# Patient Record
Sex: MALE | Race: WHITE
[De-identification: names, ages, dates, MRNs, and addresses within clinical notes are randomized per-mention and may not be internally consistent; named-entity substitution may affect disease eponyms.]

---

## 2019-03-05 ENCOUNTER — HOSPITAL ENCOUNTER (INPATIENT)
Dept: HOSPITAL 53 - M ED | Age: 44
LOS: 2 days | Discharge: HOME | DRG: 882 | End: 2019-03-07
Attending: PSYCHIATRY & NEUROLOGY | Admitting: PSYCHIATRY & NEUROLOGY
Payer: OTHER GOVERNMENT

## 2019-03-05 VITALS — WEIGHT: 185.39 LBS | HEIGHT: 68 IN | BODY MASS INDEX: 28.1 KG/M2

## 2019-03-05 DIAGNOSIS — F17.200: ICD-10-CM

## 2019-03-05 DIAGNOSIS — Z79.899: ICD-10-CM

## 2019-03-05 DIAGNOSIS — F41.9: ICD-10-CM

## 2019-03-05 DIAGNOSIS — E03.9: ICD-10-CM

## 2019-03-05 DIAGNOSIS — F13.90: ICD-10-CM

## 2019-03-05 DIAGNOSIS — Z88.5: ICD-10-CM

## 2019-03-05 DIAGNOSIS — F10.10: ICD-10-CM

## 2019-03-05 DIAGNOSIS — F43.10: Primary | ICD-10-CM

## 2019-03-05 DIAGNOSIS — I10: ICD-10-CM

## 2019-03-05 DIAGNOSIS — G47.00: ICD-10-CM

## 2019-03-05 DIAGNOSIS — Z88.8: ICD-10-CM

## 2019-03-05 DIAGNOSIS — K21.9: ICD-10-CM

## 2019-03-05 LAB
ALBUMIN SERPL BCG-MCNC: 4.3 GM/DL (ref 3.2–5.2)
ALT SERPL W P-5'-P-CCNC: 51 U/L (ref 12–78)
AMPHETAMINES UR QL SCN: NEGATIVE
APAP SERPL-MCNC: < 2 UG/ML (ref 10–30)
BARBITURATES UR QL SCN: NEGATIVE
BENZODIAZ UR QL SCN: NEGATIVE
BILIRUB CONJ SERPL-MCNC: 0.1 MG/DL (ref 0–0.2)
BILIRUB SERPL-MCNC: 0.2 MG/DL (ref 0.2–1)
BUN SERPL-MCNC: 16 MG/DL (ref 7–18)
BZE UR QL SCN: NEGATIVE
CALCIUM SERPL-MCNC: 9 MG/DL (ref 8.5–10.1)
CANNABINOIDS UR QL SCN: POSITIVE
CHLORIDE SERPL-SCNC: 103 MEQ/L (ref 98–107)
CO2 SERPL-SCNC: 24 MEQ/L (ref 21–32)
CREAT SERPL-MCNC: 1.06 MG/DL (ref 0.7–1.3)
ETHANOL SERPL-MCNC: 0.14 % (ref 0–0.01)
GFR SERPL CREATININE-BSD FRML MDRD: > 60 ML/MIN/{1.73_M2} (ref 60–?)
GLUCOSE SERPL-MCNC: 93 MG/DL (ref 70–100)
HCT VFR BLD AUTO: 45.2 % (ref 42–52)
HGB BLD-MCNC: 15.5 G/DL (ref 13.5–17.5)
MCH RBC QN AUTO: 29.5 PG (ref 27–33)
MCHC RBC AUTO-ENTMCNC: 34.3 G/DL (ref 32–36.5)
MCV RBC AUTO: 86.1 FL (ref 80–96)
METHADONE UR QL SCN: NEGATIVE
OPIATES UR QL SCN: NEGATIVE
PCP UR QL SCN: NEGATIVE
PLATELET # BLD AUTO: 305 10^3/UL (ref 150–450)
POTASSIUM SERPL-SCNC: 4.3 MEQ/L (ref 3.5–5.1)
PROT SERPL-MCNC: 7.4 GM/DL (ref 6.4–8.2)
RBC # BLD AUTO: 5.25 10^6/UL (ref 4.3–6.1)
SALICYLATES SERPL-MCNC: 3.5 MG/DL (ref 5–30)
SODIUM SERPL-SCNC: 136 MEQ/L (ref 136–145)
TSH SERPL DL<=0.005 MIU/L-ACNC: 2.03 UIU/ML (ref 0.36–3.74)
WBC # BLD AUTO: 8.5 10^3/UL (ref 4–10)

## 2019-03-05 RX ADMIN — Medication SCH MG: at 22:05

## 2019-03-05 RX ADMIN — PRAZOSIN HYDROCHLORIDE SCH MG: 1 CAPSULE ORAL at 22:05

## 2019-03-06 VITALS — SYSTOLIC BLOOD PRESSURE: 142 MMHG | DIASTOLIC BLOOD PRESSURE: 98 MMHG

## 2019-03-06 VITALS — SYSTOLIC BLOOD PRESSURE: 148 MMHG | DIASTOLIC BLOOD PRESSURE: 98 MMHG

## 2019-03-06 VITALS — DIASTOLIC BLOOD PRESSURE: 94 MMHG | SYSTOLIC BLOOD PRESSURE: 148 MMHG

## 2019-03-06 VITALS — SYSTOLIC BLOOD PRESSURE: 136 MMHG | DIASTOLIC BLOOD PRESSURE: 82 MMHG

## 2019-03-06 RX ADMIN — DULOXETINE HYDROCHLORIDE SCH MG: 30 CAPSULE, DELAYED RELEASE ORAL at 09:09

## 2019-03-06 RX ADMIN — PRAZOSIN HYDROCHLORIDE SCH MG: 1 CAPSULE ORAL at 21:00

## 2019-03-06 RX ADMIN — MULTIPLE VITAMINS W/ MINERALS TAB SCH TAB: TAB at 09:09

## 2019-03-06 RX ADMIN — OMEPRAZOLE SCH MG: 20 CAPSULE, DELAYED RELEASE ORAL at 21:00

## 2019-03-06 RX ADMIN — Medication SCH MG: at 09:09

## 2019-03-06 RX ADMIN — METOPROLOL TARTRATE SCH MG: 25 TABLET, FILM COATED ORAL at 09:00

## 2019-03-06 RX ADMIN — Medication SCH MG: at 21:00

## 2019-03-06 RX ADMIN — OMEPRAZOLE SCH MG: 20 CAPSULE, DELAYED RELEASE ORAL at 12:46

## 2019-03-06 RX ADMIN — FOLIC ACID SCH MG: 1 TABLET ORAL at 09:09

## 2019-03-06 RX ADMIN — METOPROLOL TARTRATE SCH MG: 25 TABLET, FILM COATED ORAL at 12:24

## 2019-03-06 NOTE — HPEPDOC
[facility] Medical History & Physical


Date of Admission


Mar 5, 2019





History and Physical





PCP: [facility]


ATTENDING: Dr. Lisa Carter





HPI: 43yoM admitted to [facility] for unspecified depressive disorder, being medically

examined today. 


No acute medical complaints today. 


Denies any fevers, chills, weakness, fatigue, HA, CP, SOB, cough, palpitations, 

abdominal pain, N/V/D or changes in bowel or bladder habits.





PMHx: 


anxiety


depression


PTSD


insomnia


HTN


HLD


GERD


h/o opiate use





PSHX:


Rt delilah tendon repair


Rt elbow


Rt shoulder x 3 s/p injury


Rt hand fracture repair








SOCHX:


Resides in:  [facility]


Marital Status:  


Kids: none


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 


Tobacco use: 1/2 ppd


ETOH: 8-10 drinks yesterday, states none otherwise


Illicit Drugs: States receives medical marijuana from [employer] for mood and 

sleep. 


IV Drug Use: Denies


Tattoos done unprofessionally: Denies 





FAMHX:


Mother: unknown


Father: unknown


Siblings: unknown


Children: none


Unexpected deaths due to medical reasons: None.





ROS:


As noted in HPI, otherwise 11pt ROS of systems reviewed and unremarkable.


                 


PE:      


GEN:  43yoM, appears stated age. Well-nourished, well developed. No acute 

distress. Alert and oriented x 3. Pleasant, interactive. 


HEENT: Normocephalic, atraumatic. Pupils are equal, round, and reactive to 

light. Extraocular movements are intact. No nystagmus appreciated. Sclera are 

nonicteric. Conjunctiva without injection. Nose midline. Nasal turbinates 

without bogginess. EACs both patent BL. TMs both visualized and gray with good 

cone of light, no bulging or erythema. No facial asymmetry. Moist mucous 

membranes. Dentition fair. Pharynx pink and moist, no cobblestoning. Neck supple

, trachea midline. No lymphadenopathy or thyromegaly appreciated. 


CHEST: Regular rate and rhythm, +S1, +S2


LUNGS: Clear to auscultation bilaterally. No wheezes, rales, or rhonchi. 

Breathing appears symmetric and easy. Patient is speaking in full sentences. No 

accessory muscle use.


ABD: Round, soft, non-tender, non-distended. +Bowel sounds throughout. No 

rebound or guarding. No costovertebral angle tenderness.


EXT: Pulses 2+ bilaterally dorsalis pedis and radial. No lower extremity edema 

appreciated.


SKIN: Pink, dry, warm. Capillary refill <2sec. No rashes.


NEURO: Alert and oriented x 3. Cranial nerves III-XII are intact. No focal 

deficits appreciated. 





EKG:  


SINUS RHYTHM WITH SHORT [place] INTERVAL WITH OCCASIONAL VENTRICULAR PREMATURE 


COMPLEXES


NSTTW ABNORMALITY


BORDERLINE RIGHT AXIS DEVIATION


DECREASED RATE 10/22/18


Electronically Signed On 11- 20:43:30 EST by Leny Silva





A&P:  43yoM admitted to [facility] for unspecified depressive disorder


1. Psych. Plan per Psychiatry. EKG on file.  


2. Nicotine dependence. Patch available.


3. HTN. 


Continue Lisinopril 40 mg po daily with hold parameters. 


Continue Metoprolol with hold parameters. 


4. HLD. 


Continue statin. 


5. Follow up with PCP on discharge.


6. Substance use. Management per psychiatry. Continue MVI, folic acid and 

thiamine supplements. 


7. Hypothyroid. 


Continue supplement. 


TSH WNL. 


8. GERD. 


Continue prilosec 40 mg po daily. 


9. Staff member Charissa LOTT present throughout exam.





Vital Signs





Vital Signs








  Date Time  Temp Pulse Resp B/P (MAP) Pulse Ox O2 Delivery O2 Flow Rate FiO2


 


3/6/19 07:59  87  142/98    


 


3/6/19 06:16 99.9  18     


 


3/5/19 20:39     96 Room Air  











Laboratory Data


Labs 24H


Laboratory Tests 2


3/5/19 17:46: 


Nucleated Red Blood Cells % (auto) 0.0, Anion Gap 9, Glomerular Filtration Rate 

> 60.0, Calcium Level 9.0, Aspartate Amino Transf (AST/SGOT) 29, Alanine 

Aminotransferase (ALT/SGPT) 51, Alkaline Phosphatase 121H, Total Bilirubin 0.2, 

Direct Bilirubin 0.1, Total Protein 7.4, Albumin 4.3, Albumin/Globulin Ratio 

1.39, Thyroid Stimulating Hormone (TSH) 2.030, Salicylates Level 3.5L, Urine 

Amphetamines Screen NEGATIVE, Urine Benzodiazepines Screen NEGATIVE, Urine 

Opiates Screen NEGATIVE, Urine Methadone Screen NEGATIVE, Acetaminophen Level < 

2.0L, Urine Barbiturates Screen NEGATIVE, Urine Phencyclidine Screen NEGATIVE, 

Urine Cocaine Metabolite Screen NEGATIVE, Urine Cannabinoids Screen POSITIVEH, 

Ethyl Alcohol Level 0.140H


CBC/BMP


Laboratory Tests


3/5/19 17:46








Red Blood Count 5.25, Mean Corpuscular Volume 86.1, Mean Corpuscular Hemoglobin 

29.5, Mean Corpuscular Hemoglobin Concent 34.3, Red Cell Distribution Width 13.5





Home Medications


Scheduled


Levothyroxine Sodium (Synthroid) 25 Mcg Tab, 1 TAB PO DAILY


Simvastatin (Simvastatin) 20 Mg Tab, 20 MG PO QPM





Miscellaneous Medications


 (Duloxetine Hydrocloride) 60 Mg Cap, 120 MG


Clonazepam (Klonopin) 1 Mg Tab


Lisinopril (Lisinopril) 40 Mg Tab


Metoprolol Tartrate (Metoprolol Tartrate) 25 Mg Tab


Omeprazole (Omeprazole Dr) 40 Mg Cap


Prazosin Hcl (Prazosin HCl) 2 Mg Cap


Quetiapine Fumerate (Seroquel) 100 Mg Tab, 100 MG





Allergies


Coded Allergies:  


     Acetaminophen (Verified  Adverse Reaction, Unknown, cannot take due to 

prior addiction, 11/24/18)


     Hydrocodone (Verified  Adverse Reaction, Unknown, cannot take due to prior 

addiction, 11/24/18)











Mone Prajapati               Mar 6, 2019 10:57
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
## 2019-03-07 VITALS — SYSTOLIC BLOOD PRESSURE: 148 MMHG | DIASTOLIC BLOOD PRESSURE: 98 MMHG

## 2019-03-07 VITALS — DIASTOLIC BLOOD PRESSURE: 98 MMHG | SYSTOLIC BLOOD PRESSURE: 151 MMHG

## 2019-03-07 RX ADMIN — OMEPRAZOLE SCH MG: 20 CAPSULE, DELAYED RELEASE ORAL at 08:08

## 2019-03-07 RX ADMIN — DULOXETINE HYDROCHLORIDE SCH MG: 30 CAPSULE, DELAYED RELEASE ORAL at 08:08

## 2019-03-07 RX ADMIN — METOPROLOL TARTRATE SCH MG: 25 TABLET, FILM COATED ORAL at 08:09

## 2019-03-07 RX ADMIN — FOLIC ACID SCH MG: 1 TABLET ORAL at 08:08

## 2019-03-07 RX ADMIN — MULTIPLE VITAMINS W/ MINERALS TAB SCH TAB: TAB at 08:08

## 2019-03-07 RX ADMIN — Medication SCH MG: at 08:08
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
## 2019-03-07 NOTE — REP
RIGHT HAND SERIES:

 

Four views of the right hand are performed.  I see no acute fracture or

dislocation.  There is an old healed fracture of the proximal fifth metacarpal.

Subchondral cystic changes are seen in the distal radius in the scaphoid.

 

IMPRESSION:

 

No acute fracture or dislocation.

 

 

Electronically Signed by

Mustapha Keith MD 03/07/2019 04:58 P

## 2022-05-23 ENCOUNTER — HOSPITAL ENCOUNTER (OUTPATIENT)
Dept: HOSPITAL 53 - M RAD | Age: 47
End: 2022-05-23
Attending: FAMILY MEDICINE

## 2022-05-23 DIAGNOSIS — M54.50: Primary | ICD-10-CM

## 2022-06-21 ENCOUNTER — HOSPITAL ENCOUNTER (OUTPATIENT)
Dept: HOSPITAL 53 - M PLALAB | Age: 47
End: 2022-06-21
Attending: INTERNAL MEDICINE
Payer: MEDICARE

## 2022-06-21 DIAGNOSIS — M46.20: ICD-10-CM

## 2022-06-21 DIAGNOSIS — L81.8: Primary | ICD-10-CM

## 2022-06-21 LAB
ALBUMIN SERPL BCG-MCNC: 4.2 GM/DL (ref 3.2–5.2)
ALT SERPL W P-5'-P-CCNC: 71 U/L (ref 12–78)
BASOPHILS # BLD AUTO: 0.1 10^3/UL (ref 0–0.2)
BASOPHILS NFR BLD AUTO: 0.9 % (ref 0–1)
BILIRUB SERPL-MCNC: 0.3 MG/DL (ref 0.2–1)
BUN SERPL-MCNC: 10 MG/DL (ref 7–18)
CALCIUM SERPL-MCNC: 9.6 MG/DL (ref 8.5–10.1)
CHLORIDE SERPL-SCNC: 104 MEQ/L (ref 98–107)
CO2 SERPL-SCNC: 28 MEQ/L (ref 21–32)
CREAT SERPL-MCNC: 0.93 MG/DL (ref 0.7–1.3)
CRP SERPL-MCNC: 0.3 MG/DL (ref 0–0.3)
EOSINOPHIL # BLD AUTO: 0.1 10^3/UL (ref 0–0.5)
EOSINOPHIL NFR BLD AUTO: 2 % (ref 0–3)
ERYTHROCYTE [SEDIMENTATION RATE] IN BLOOD BY WESTERGREN METHOD: 2 MM/HR (ref 0–15)
GFR SERPL CREATININE-BSD FRML MDRD: > 60 ML/MIN/{1.73_M2} (ref 60–?)
GLUCOSE SERPL-MCNC: 92 MG/DL (ref 70–100)
HCT VFR BLD AUTO: 44.8 % (ref 42–52)
HCV AB SER QL: 0.1 INDEX (ref ?–0.8)
HGB BLD-MCNC: 14.9 G/DL (ref 13.5–17.5)
LYMPHOCYTES # BLD AUTO: 1.5 10^3/UL (ref 1.5–5)
LYMPHOCYTES NFR BLD AUTO: 23.6 % (ref 24–44)
MCH RBC QN AUTO: 30.3 PG (ref 27–33)
MCHC RBC AUTO-ENTMCNC: 33.3 G/DL (ref 32–36.5)
MCV RBC AUTO: 91.1 FL (ref 80–96)
MONOCYTES # BLD AUTO: 0.6 10^3/UL (ref 0–0.8)
MONOCYTES NFR BLD AUTO: 9.8 % (ref 2–8)
NEUTROPHILS # BLD AUTO: 4.1 10^3/UL (ref 1.5–8.5)
NEUTROPHILS NFR BLD AUTO: 63.2 % (ref 36–66)
PLATELET # BLD AUTO: 376 10^3/UL (ref 150–450)
POTASSIUM SERPL-SCNC: 4.4 MEQ/L (ref 3.5–5.1)
PROT SERPL-MCNC: 6.9 GM/DL (ref 6.4–8.2)
RBC # BLD AUTO: 4.92 10^6/UL (ref 4.3–6.1)
SODIUM SERPL-SCNC: 140 MEQ/L (ref 136–145)
WBC # BLD AUTO: 6.5 10^3/UL (ref 4–10)

## 2022-07-13 ENCOUNTER — HOSPITAL ENCOUNTER (OUTPATIENT)
Dept: HOSPITAL 53 - M PLAIMG | Age: 47
End: 2022-07-13
Attending: FAMILY MEDICINE
Payer: OTHER GOVERNMENT

## 2022-07-13 DIAGNOSIS — M43.8X6: Primary | ICD-10-CM

## 2022-07-13 DIAGNOSIS — M54.50: ICD-10-CM

## 2022-07-13 DIAGNOSIS — Z96.9: ICD-10-CM

## 2022-07-22 ENCOUNTER — HOSPITAL ENCOUNTER (EMERGENCY)
Dept: HOSPITAL 53 - M ED | Age: 47
Discharge: LEFT BEFORE DISCHARGE | End: 2022-07-22
Payer: OTHER GOVERNMENT

## 2022-07-22 VITALS — BODY MASS INDEX: 25.23 KG/M2 | HEIGHT: 66 IN | WEIGHT: 156.97 LBS

## 2022-07-22 VITALS — DIASTOLIC BLOOD PRESSURE: 92 MMHG | SYSTOLIC BLOOD PRESSURE: 154 MMHG

## 2022-07-22 DIAGNOSIS — Z53.21: Primary | ICD-10-CM

## 2023-05-03 ENCOUNTER — HOSPITAL ENCOUNTER (OUTPATIENT)
Dept: HOSPITAL 53 - M SFHCDERM | Age: 48
End: 2023-05-03
Attending: NURSE PRACTITIONER
Payer: OTHER GOVERNMENT

## 2023-05-03 DIAGNOSIS — L30.9: Primary | ICD-10-CM

## 2023-06-02 ENCOUNTER — HOSPITAL ENCOUNTER (EMERGENCY)
Dept: HOSPITAL 53 - M ED | Age: 48
Discharge: PSYCHIATRIC OR SUBSTANCE USE FACILITY | End: 2023-06-02
Payer: MEDICARE

## 2023-06-02 VITALS — SYSTOLIC BLOOD PRESSURE: 168 MMHG | DIASTOLIC BLOOD PRESSURE: 102 MMHG

## 2023-06-02 VITALS — WEIGHT: 160.72 LBS | HEIGHT: 66 IN | BODY MASS INDEX: 25.83 KG/M2

## 2023-06-02 VITALS — DIASTOLIC BLOOD PRESSURE: 120 MMHG | OXYGEN SATURATION: 99 % | SYSTOLIC BLOOD PRESSURE: 168 MMHG | TEMPERATURE: 96.9 F

## 2023-06-02 DIAGNOSIS — K21.9: ICD-10-CM

## 2023-06-02 DIAGNOSIS — F32.A: ICD-10-CM

## 2023-06-02 DIAGNOSIS — F12.10: ICD-10-CM

## 2023-06-02 DIAGNOSIS — F41.9: ICD-10-CM

## 2023-06-02 DIAGNOSIS — I10: ICD-10-CM

## 2023-06-02 DIAGNOSIS — Z79.899: ICD-10-CM

## 2023-06-02 DIAGNOSIS — R45.851: Primary | ICD-10-CM

## 2023-06-02 DIAGNOSIS — F90.9: ICD-10-CM

## 2023-06-02 DIAGNOSIS — E78.5: ICD-10-CM

## 2023-06-02 LAB
ALBUMIN SERPL BCG-MCNC: 4.5 G/DL (ref 3.2–5.2)
ALP SERPL-CCNC: 194 U/L (ref 46–116)
ALT SERPL W P-5'-P-CCNC: 54 U/L (ref 7–40)
AMPHETAMINES UR QL SCN: NEGATIVE
APAP SERPL-MCNC: < 2 UG/ML (ref 10–20)
APPEARANCE UR: CLEAR
AST SERPL-CCNC: 16 U/L (ref ?–34)
BACTERIA UR QL AUTO: NEGATIVE
BARBITURATES UR QL SCN: NEGATIVE
BENZODIAZ UR QL SCN: NEGATIVE
BILIRUB CONJ SERPL-MCNC: < 0.1 MG/DL (ref ?–0.4)
BILIRUB SERPL-MCNC: 0.3 MG/DL (ref 0.3–1.2)
BILIRUB UR QL STRIP.AUTO: NEGATIVE
BUN SERPL-MCNC: 12 MG/DL (ref 9–23)
BZE UR QL SCN: NEGATIVE
CALCIUM SERPL-MCNC: 9.3 MG/DL (ref 8.5–10.1)
CANNABINOIDS UR QL SCN: POSITIVE
CHLORIDE SERPL-SCNC: 102 MMOL/L (ref 98–107)
CO2 SERPL-SCNC: 25 MMOL/L (ref 20–31)
CREAT SERPL-MCNC: 0.84 MG/DL (ref 0.7–1.3)
ETHANOL SERPL-MCNC: 0.1 % (ref 0–0.01)
GFR SERPL CREATININE-BSD FRML MDRD: > 60 ML/MIN/{1.73_M2} (ref 60–?)
GLUCOSE SERPL-MCNC: 83 MG/DL (ref 60–100)
GLUCOSE UR QL STRIP.AUTO: NEGATIVE MG/DL
HCT VFR BLD AUTO: 45.7 % (ref 42–52)
HGB BLD-MCNC: 15 G/DL (ref 13.5–17.5)
HGB UR QL STRIP.AUTO: NEGATIVE
KETONES UR QL STRIP.AUTO: NEGATIVE MG/DL
LEUKOCYTE ESTERASE UR QL STRIP.AUTO: NEGATIVE
MCH RBC QN AUTO: 28.1 PG (ref 27–33)
MCHC RBC AUTO-ENTMCNC: 32.8 G/DL (ref 32–36.5)
MCV RBC AUTO: 85.6 FL (ref 80–96)
METHADONE UR QL SCN: NEGATIVE
NITRITE UR QL STRIP.AUTO: NEGATIVE
OPIATES UR QL SCN: NEGATIVE
PCP UR QL SCN: NEGATIVE
PH UR STRIP.AUTO: 6 UNITS (ref 5–9)
PLATELET # BLD AUTO: 331 10^3/UL (ref 150–450)
POTASSIUM SERPL-SCNC: 3.8 MMOL/L (ref 3.5–5.1)
PROT SERPL-MCNC: 7.6 G/DL (ref 5.7–8.2)
PROT UR QL STRIP.AUTO: NEGATIVE MG/DL
RBC # BLD AUTO: 5.34 10^6/UL (ref 4.3–6.1)
RBC # UR AUTO: 0 /HPF (ref 0–3)
SALICYLATES SERPL-MCNC: < 3 MG/DL (ref ?–30)
SODIUM SERPL-SCNC: 137 MMOL/L (ref 136–145)
SP GR UR STRIP.AUTO: 1 (ref 1–1.03)
SQUAMOUS #/AREA URNS AUTO: 0 /HPF (ref 0–6)
TSH SERPL DL<=0.005 MIU/L-ACNC: 4.71 UIU/ML (ref 0.55–4.78)
UROBILINOGEN UR QL STRIP.AUTO: 0.2 MG/DL (ref 0–2)
WBC # BLD AUTO: 8.7 10^3/UL (ref 4–10)
WBC #/AREA URNS AUTO: 0 /HPF (ref 0–3)

## 2024-08-27 ENCOUNTER — HOSPITAL ENCOUNTER (EMERGENCY)
Dept: HOSPITAL 53 - M ED | Age: 49
Discharge: HOME | End: 2024-08-27
Payer: OTHER GOVERNMENT

## 2024-08-27 VITALS — BODY MASS INDEX: 22.02 KG/M2 | WEIGHT: 140.3 LBS | HEIGHT: 67 IN

## 2024-08-27 VITALS — TEMPERATURE: 97 F | DIASTOLIC BLOOD PRESSURE: 95 MMHG | OXYGEN SATURATION: 100 % | SYSTOLIC BLOOD PRESSURE: 149 MMHG

## 2024-08-27 DIAGNOSIS — F43.10: ICD-10-CM

## 2024-08-27 DIAGNOSIS — G89.29: ICD-10-CM

## 2024-08-27 DIAGNOSIS — F11.10: ICD-10-CM

## 2024-08-27 DIAGNOSIS — I10: ICD-10-CM

## 2024-08-27 DIAGNOSIS — Z79.899: ICD-10-CM

## 2024-08-27 DIAGNOSIS — E78.5: ICD-10-CM

## 2024-08-27 DIAGNOSIS — F32.A: Primary | ICD-10-CM

## 2024-08-27 DIAGNOSIS — K21.9: ICD-10-CM

## 2024-08-27 LAB
ALBUMIN SERPL BCG-MCNC: 4.4 G/DL (ref 3.2–5.2)
ALP SERPL-CCNC: 132 U/L (ref 46–116)
ALT SERPL W P-5'-P-CCNC: 30 U/L (ref 7–40)
AST SERPL-CCNC: 25 U/L (ref ?–34)
BILIRUB CONJ SERPL-MCNC: 0.2 MG/DL (ref ?–0.4)
BILIRUB SERPL-MCNC: 0.6 MG/DL (ref 0.3–1.2)
BUN SERPL-MCNC: 11 MG/DL (ref 9–23)
CALCIUM SERPL-MCNC: 10 MG/DL (ref 8.5–10.1)
CHLORIDE SERPL-SCNC: 108 MMOL/L (ref 98–107)
CO2 SERPL-SCNC: 24 MMOL/L (ref 20–31)
CREAT SERPL-MCNC: 1.12 MG/DL (ref 0.7–1.3)
ETHANOL SERPL-MCNC: < 0.003 % (ref 0–0.01)
GFR SERPL CREATININE-BSD FRML MDRD: > 60 ML/MIN/{1.73_M2} (ref 60–?)
GLUCOSE SERPL-MCNC: 111 MG/DL (ref 60–100)
HCT VFR BLD AUTO: 42.6 % (ref 42–52)
HGB BLD-MCNC: 14.4 G/DL (ref 13.5–17.5)
MCH RBC QN AUTO: 30.9 PG (ref 27–33)
MCHC RBC AUTO-ENTMCNC: 33.8 G/DL (ref 32–36.5)
MCV RBC AUTO: 91.4 FL (ref 80–96)
PLATELET # BLD AUTO: 216 10^3/UL (ref 150–450)
POTASSIUM SERPL-SCNC: 3.7 MMOL/L (ref 3.5–5.1)
PROT SERPL-MCNC: 7.4 G/DL (ref 5.7–8.2)
RBC # BLD AUTO: 4.66 10^6/UL (ref 4.3–6.1)
SALICYLATES SERPL-MCNC: < 3 MG/DL (ref ?–30)
SODIUM SERPL-SCNC: 141 MMOL/L (ref 136–145)
TSH SERPL DL<=0.005 MIU/L-ACNC: 2.3 UIU/ML (ref 0.55–4.78)
WBC # BLD AUTO: 7.3 10^3/UL (ref 4–10)

## 2024-08-27 RX ADMIN — MORPHINE SULFATE ONE MG: 4 INJECTION INTRAVENOUS at 08:09

## 2024-08-27 RX ADMIN — PREGABALIN ONE MG: 50 CAPSULE ORAL at 07:26
